# Patient Record
Sex: FEMALE | Race: BLACK OR AFRICAN AMERICAN | NOT HISPANIC OR LATINO | Employment: UNEMPLOYED | URBAN - METROPOLITAN AREA
[De-identification: names, ages, dates, MRNs, and addresses within clinical notes are randomized per-mention and may not be internally consistent; named-entity substitution may affect disease eponyms.]

---
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
## 2024-10-10 NOTE — ED NOTES
"Assisted pt to the bathroom and back to bed. I asked her if she needed anything else right now and pt stated \"I don't have anything to eat.\" I asked her if she wanted a snack and she replied yes.    Pt is resting comfortably in bed, watching tv, and eating a snack. Stress ball also given to pt to fidget with.  Bed alarm on, call light and belongings within reach, will be continuing to monitor.  " Patient signed out to me pending admission to mental health either here or transfer to another facility once a bed can be located.  No issues during my shift.  Patient care will be signed out to oncoming physician.     Deshaun Lowe,   02/10/21 0606